# Patient Record
Sex: FEMALE | Race: WHITE | Employment: FULL TIME | URBAN - METROPOLITAN AREA
[De-identification: names, ages, dates, MRNs, and addresses within clinical notes are randomized per-mention and may not be internally consistent; named-entity substitution may affect disease eponyms.]

---

## 2017-08-21 ENCOUNTER — OFFICE VISIT (OUTPATIENT)
Dept: NEUROLOGY | Age: 37
End: 2017-08-21

## 2017-08-21 VITALS
HEART RATE: 63 BPM | OXYGEN SATURATION: 98 % | SYSTOLIC BLOOD PRESSURE: 140 MMHG | DIASTOLIC BLOOD PRESSURE: 70 MMHG | HEIGHT: 63 IN | WEIGHT: 163 LBS | BODY MASS INDEX: 28.88 KG/M2

## 2017-08-21 DIAGNOSIS — F39 MOOD DISORDER (HCC): ICD-10-CM

## 2017-08-21 DIAGNOSIS — G43.909 MIGRAINE WITHOUT STATUS MIGRAINOSUS, NOT INTRACTABLE, UNSPECIFIED MIGRAINE TYPE: Primary | ICD-10-CM

## 2017-08-21 PROBLEM — G43.109 MIGRAINE WITH AURA AND WITHOUT STATUS MIGRAINOSUS, NOT INTRACTABLE: Status: ACTIVE | Noted: 2017-08-21

## 2017-08-21 PROBLEM — F41.1 GENERALIZED ANXIETY DISORDER: Status: ACTIVE | Noted: 2017-08-21

## 2017-08-21 RX ORDER — PROPRANOLOL HYDROCHLORIDE 60 MG/1
CAPSULE, EXTENDED RELEASE ORAL DAILY
COMMUNITY

## 2017-08-21 RX ORDER — HYDROXYZINE 25 MG/1
TABLET, FILM COATED ORAL
COMMUNITY

## 2017-08-21 RX ORDER — TOPIRAMATE 50 MG/1
TABLET, FILM COATED ORAL
Qty: 60 TAB | Refills: 5 | Status: SHIPPED | OUTPATIENT
Start: 2017-08-21

## 2017-08-21 RX ORDER — BUTALBITAL, ACETAMINOPHEN, CAFFEINE AND CODEINE PHOSPHATE 50; 325; 40; 30 MG/1; MG/1; MG/1; MG/1
CAPSULE ORAL
COMMUNITY

## 2017-08-21 RX ORDER — BUTALBITAL, ACETAMINOPHEN AND CAFFEINE 50; 325; 40 MG/1; MG/1; MG/1
TABLET ORAL
Qty: 40 TAB | Refills: 2 | Status: SHIPPED | OUTPATIENT
Start: 2017-08-21

## 2017-08-21 NOTE — PATIENT INSTRUCTIONS
[facility]   Statement to Patients  April 1, 2014      In an effort to ensure the large volume of patient prescription refills is processed in the most efficient and expeditious manner, we are asking our patients to assist us by calling your Pharmacy for all prescription refills, this will include also your  Mail Order Pharmacy. The pharmacy will contact our office electronically to continue the refill process. Please do not wait until the last minute to call your pharmacy. We need at least 48 hours (2days) to fill prescriptions. We also encourage you to call your pharmacy before going to  your prescription to make sure it is ready. With regard to controlled substance prescription refill requests (narcotic refills) that need to be picked up at our office, we ask your cooperation by providing us with at least 72 hours (3days) notice that you will need a refill. We will not refill narcotic prescription refill requests after 4:00pm on any weekday, Monday through Thursday, or after 2:00pm on Fridays, or on the weekends. We encourage everyone to explore another way of getting your prescription refill request processed using [employer], our patient web portal through our electronic medical record system. [employer] is an efficient and effective way to communicate your medication request directly to the office and  downloadable as an teagan on your smart phone . [employer] also features a review functionality that allows you to view your medication list as well as leave messages for your physician. Are you ready to get connected? If so please review the attatched instructions or speak to any of our staff to get you set up right away! Thank you so much for your cooperation. Should you have any questions please contact our Practice Administrator.     The Physicians and Staff,  [facility]

## 2017-08-21 NOTE — PROGRESS NOTES
HISTORY OF PRESENT ILLNESS  Jose Marshall is a 40 y.o. female. HPI Comments: Jaxon Ruffin is a 51-year-old woman with migraines. She has about 2 week. She has not seen a neurologist for. She has been cared for by Dr. Vonda Rodriguez. She has been taking propranolol extended release 60 mg a day and Fioricet as needed however the practice has not been excited about refilling Fioricet. She has some minor psychiatric history and takes Brintellix 10 mg a day. She is on oral contraceptives. She has not been getting good good control of her headaches. They are worse around her menses. She does have a family history of migraine, her mother had them. Headache   The history is provided by the patient. This is a chronic problem. Episode onset: 2-3 years. Associated symptoms include headaches. Migraine    This is a chronic problem. Episode onset: Several years. Review of Systems   Constitutional:        Review of systems is positive for anxiety fatigue frequent headaches joint pain. All other systems are reviewed and are negative. Neurological: Positive for headaches. MEDS  Tri-Linyah every day  Trintellix 10 mg every day  Propranolol ER 60mg every day  Fioricet PRN    Past Medical History:   Diagnosis Date    Headache     Migraines      Family history is positive for migraine    Social History     Social History    Marital status: SINGLE     Spouse name: N/A    Number of children: N/A    Years of education: N/A     Social History Main Topics    Smoking status: Former Smoker    Smokeless tobacco: Current User    Alcohol use No    Drug use: No    Sexual activity: Not Asked     Other Topics Concern    None     Social History Narrative    None     /70  Pulse 63  Ht 5' 3\" (1.6 m)  Wt 163 lb (73.9 kg)  SpO2 98%  BMI 28.87 kg/m2      Physical Exam   Constitutional: She is oriented to person, place, and time. She appears well-developed and well-nourished. No distress.    HENT: Head: Normocephalic and atraumatic. Mouth/Throat: Oropharynx is clear and moist. No oropharyngeal exudate. Eyes: Conjunctivae and EOM are normal. Pupils are equal, round, and reactive to light. No scleral icterus. Neck: Normal range of motion. Neck supple. No thyromegaly present. Cardiovascular: Normal rate, regular rhythm and normal heart sounds. No murmur heard. Musculoskeletal: Normal range of motion. She exhibits no edema, tenderness or deformity. Lymphadenopathy:     She has no cervical adenopathy. Neurological: She is alert and oriented to person, place, and time. She has normal strength and normal reflexes. She displays no atrophy and no tremor. No cranial nerve deficit or sensory deficit. She exhibits normal muscle tone. She displays a negative Romberg sign. Coordination and gait normal. She displays no Babinski's sign on the right side. She displays no Babinski's sign on the left side. Speech language and mentation are normal.  Visual fields are full to confrontation, funduscopic exam is normal   Skin: Skin is warm and dry. No rash noted. She is not diaphoretic. No erythema. Psychiatric: She has a normal mood and affect. Her behavior is normal. Judgment and thought content normal.   Vitals reviewed. ASSESSMENT and PLAN  MIGRAINE HEADACHE  I am going to start her on Topamax 50 mg a day for a week and then up to 50 mg twice a day. I will write her Fioricet 40 a month not to exceed that. Take 1 or 2 at the onset of the headache and can repeat ×1 I will stop her Inderal once the Topamax is in place. .  She has a normal neurologic exam is female and has a positive family history as I do not think she needs scans of her head. I will see her back in 3 months. I have asked her to keep a migraine diary. MOOD DISORDER  She will continue to take her chin Telex 10 mg once a day, this is managed by her primary care physician. It is stable.

## 2017-08-21 NOTE — MR AVS SNAPSHOT
Visit Information Date & Time Provider Department Dept. Phone Encounter #  
 8/21/2017  1:00 PM Lena Ayon MD Neurology Clinic at [facility] [phone] [id number] Follow-up Instructions Return in about 3 months (around 11/21/2017). Your Appointments 8/21/2017  1:00 PM  
New Patient with Lena Ayon MD  
Neurology Clinic at [address]) Appt Note: Np evaluation for migraines ia 7/28/17  
 [address] [place] [address], 
[address] [place] [address], [phone]  
[address], [address] [place] [address], [address] [phone]  
  
    
 11/21/2017  2:40 PM  
Follow Up with Lena Ayon MD  
Neurology Clinic at [address]) Appt Note: follow up headaches $ 0 CP jll 8/21/17  
 [address] [place] [address], 
[address] [place] [address], [address] [phone]  
[address], [address] [place] [address], [address] [phone] Upcoming Health Maintenance Date Due DTaP/Tdap/Td series (1 - Tdap) 6/26/2001 PAP AKA CERVICAL CYTOLOGY 6/26/2001 INFLUENZA AGE 9 TO ADULT 8/1/2017 Allergies as of 8/21/2017  Review Complete On: 8/21/2017 By: Ken Ford LPN Not on File Current Immunizations  Never Reviewed No immunizations on file. Not reviewed this visit Vitals BP Pulse Height(growth percentile) Weight(growth percentile) SpO2 BMI  
 140/70 63 5' 3\" (1.6 m) 163 lb (73.9 kg) 98% 28.87 kg/m2 Smoking Status Former Smoker Vitals History BMI and BSA Data Body Mass Index Body Surface Area  
 28.87 kg/m 2 1.81 m 2 Your Updated Medication List  
  
   
This list is accurate as of: 8/21/17 11:59 AM.  Always use your most recent med list.  
  
  
  
  
 butalbital-acetaminophen-caffeine -40 mg per tablet Commonly known as:  Hitesh Saldaña  
 1 or 2 p.o. as needed headache do not exceed 6 per day  Indications: MIGRAINE  
  
 codeine-butalbital-acetaminophen-caffeine -34-30 mg per capsule Commonly known as:  FIORICET WITH CODEINE Take  by mouth every four (4) hours as needed for Headache.  
  
 hydrOXYzine HCl 25 mg tablet Commonly known as:  ATARAX Take  by mouth four (4) times daily as needed for Itching. propranolol LA 60 mg SR capsule Commonly known as:  INDERAL LA Take  by mouth daily. topiramate 50 mg tablet Commonly known as:  TOPAMAX  
1 tablet nightly for 1 week then 1 tablet twice daily  Indications: MIGRAINE PREVENTION  
  
 TRINTELLIX 10 mg tablet Generic drug:  vortioxetine Take  by mouth daily. Prescriptions Printed Refills  
 topiramate (TOPAMAX) 50 mg tablet 5 Si tablet nightly for 1 week then 1 tablet twice daily  Indications: MIGRAINE PREVENTION Class: Print  
 butalbital-acetaminophen-caffeine (FIORICET, ESGIC) -40 mg per tablet 2 Si or 2 p.o. as needed headache do not exceed 6 per day  Indications: MIGRAINE Class: Print Follow-up Instructions Return in about 3 months (around 2017). Patient Instructions PRESCRIPTION REFILL POLICY [facility] Statement to Patients 2014 In an effort to ensure the large volume of patient prescription refills is processed in the most efficient and expeditious manner, we are asking our patients to assist us by calling your Pharmacy for all prescription refills, this will include also your  Mail Order Pharmacy. The pharmacy will contact our office electronically to continue the refill process. Please do not wait until the last minute to call your pharmacy. We need at least 48 hours (2days) to fill prescriptions. We also encourage you to call your pharmacy before going to  your prescription to make sure it is ready. With regard to controlled substance prescription refill requests (narcotic refills) that need to be picked up at our office, we ask your cooperation by providing us with at least 72 hours (3days) notice that you will need a refill. We will not refill narcotic prescription refill requests after 4:00pm on any weekday, Monday through Thursday, or after 2:00pm on Fridays, or on the weekends. We encourage everyone to explore another way of getting your prescription refill request processed using [employer], our patient web portal through our electronic medical record system. [employer] is an efficient and effective way to communicate your medication request directly to the office and  downloadable as an teagan on your smart phone . [employer] also features a review functionality that allows you to view your medication list as well as leave messages for your physician. Are you ready to get connected? If so please review the attatched instructions or speak to any of our staff to get you set up right away! Thank you so much for your cooperation. Should you have any questions please contact our Practice Administrator. The Physicians and Staff,  [facility] Introducing [facility] SERVICES! [facility] introduces [employer] patient portal. Now you can access parts of your medical record, email your doctor's office, and request medication refills online. 1. In your internet browser, go to [email or web address] 2. Click on the First Time User? Click Here link in the Sign In box. You will see the New Member Sign Up page. 3. Enter your [employer] Access Code exactly as it appears below. You will not need to use this code after youve completed the sign-up process. If you do not sign up before the expiration date, you must request a new code. · [employer] Access Code: [email or web address] Expires: 11/19/2017 11:22 AM 
 
4.  Enter the last four digits of your Social Security Number (xxxx) and Date of Birth (mm/dd/yyyy) as indicated and click Submit. You will be taken to the next sign-up page. 5. Create a [employer] ID. This will be your [employer] login ID and cannot be changed, so think of one that is secure and easy to remember. 6. Create a [employer] password. You can change your password at any time. 7. Enter your Password Reset Question and Answer. This can be used at a later time if you forget your password. 8. Enter your e-mail address. You will receive e-mail notification when new information is available in [address] 9. Click Sign Up. You can now view and download portions of your medical record. 10. Click the Download Summary menu link to download a portable copy of your medical information. If you have questions, please visit the Frequently Asked Questions section of the [employer] website. Remember, [employer] is NOT to be used for urgent needs. For medical emergencies, dial 911. Now available from your iPhone and Android! Please provide this summary of care documentation to your next provider. If you have any questions after today's visit, please call [phone].